# Patient Record
Sex: MALE | Race: WHITE
[De-identification: names, ages, dates, MRNs, and addresses within clinical notes are randomized per-mention and may not be internally consistent; named-entity substitution may affect disease eponyms.]

---

## 2018-01-03 ENCOUNTER — HOSPITAL ENCOUNTER (EMERGENCY)
Dept: HOSPITAL 63 - ER | Age: 53
LOS: 1 days | Discharge: ANOTHER HOSPITAL | End: 2018-01-04
Payer: COMMERCIAL

## 2018-01-03 VITALS — HEIGHT: 68 IN | WEIGHT: 220 LBS | BODY MASS INDEX: 33.34 KG/M2

## 2018-01-03 DIAGNOSIS — T18.128A: Primary | ICD-10-CM
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
## 2018-01-03 DIAGNOSIS — K21.9: ICD-10-CM

## 2018-01-03 DIAGNOSIS — R10.13: ICD-10-CM

## 2018-01-03 DIAGNOSIS — I10: ICD-10-CM
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
## 2018-01-03 LAB
ALBUMIN SERPL-MCNC: 4.1 G/DL (ref 3.4–5)
ALBUMIN/GLOB SERPL: 1.2 {RATIO} (ref 1–1.7)
ALP SERPL-CCNC: 86 U/L (ref 46–116)
ALT SERPL-CCNC: 51 U/L (ref 16–63)
ANION GAP SERPL CALC-SCNC: 9 MMOL/L (ref 6–14)
AST SERPL-CCNC: 19 U/L (ref 15–37)
BASOPHILS # BLD AUTO: 0.1 X10^3/UL (ref 0–0.2)
BASOPHILS NFR BLD: 1 % (ref 0–3)
BILIRUB SERPL-MCNC: 0.5 MG/DL (ref 0.2–1)
BUN/CREAT SERPL: 12 (ref 6–20)
CA-I SERPL ISE-MCNC: 16 MG/DL (ref 8–26)
CALCIUM SERPL-MCNC: 9.3 MG/DL (ref 8.5–10.1)
CHLORIDE SERPL-SCNC: 98 MMOL/L (ref 98–107)
CK SERPL-CCNC: 72 U/L (ref 39–308)
CO2 SERPL-SCNC: 30 MMOL/L (ref 21–32)
CREAT SERPL-MCNC: 1.3 MG/DL (ref 0.7–1.3)
EOSINOPHIL NFR BLD: 0 % (ref 0–3)
EOSINOPHIL NFR BLD: 0 X10^3/UL (ref 0–0.7)
ERYTHROCYTE [DISTWIDTH] IN BLOOD BY AUTOMATED COUNT: 13.2 % (ref 11.5–14.5)
GFR SERPLBLD BASED ON 1.73 SQ M-ARVRAT: 58.2 ML/MIN
GLOBULIN SER-MCNC: 3.4 G/DL (ref 2.2–3.8)
GLUCOSE SERPL-MCNC: 94 MG/DL (ref 70–99)
HCT VFR BLD CALC: 52.5 % (ref 39–53)
HGB BLD-MCNC: 18.2 G/DL (ref 13–17.5)
LIPASE: 542 U/L (ref 73–393)
LYMPHOCYTES # BLD: 1.9 X10^3/UL (ref 1–4.8)
LYMPHOCYTES NFR BLD AUTO: 18 % (ref 24–48)
MCH RBC QN AUTO: 32 PG (ref 25–35)
MCHC RBC AUTO-ENTMCNC: 35 G/DL (ref 31–37)
MCV RBC AUTO: 92 FL (ref 79–100)
MONO #: 0.5 X10^3/UL (ref 0–1.1)
MONOCYTES NFR BLD: 5 % (ref 0–9)
NEUT #: 8.2 X10^3UL (ref 1.8–7.7)
NEUTROPHILS NFR BLD AUTO: 76 % (ref 31–73)
PLATELET # BLD AUTO: 264 X10^3/UL (ref 140–400)
POTASSIUM SERPL-SCNC: 3.7 MMOL/L (ref 3.5–5.1)
PROT SERPL-MCNC: 7.5 G/DL (ref 6.4–8.2)
RBC # BLD AUTO: 5.73 X10^6/UL (ref 4.3–5.7)
SODIUM SERPL-SCNC: 137 MMOL/L (ref 136–145)
WBC # BLD AUTO: 10.7 X10^3/UL (ref 4–11)

## 2018-01-03 PROCEDURE — 99285 EMERGENCY DEPT VISIT HI MDM: CPT

## 2018-01-03 PROCEDURE — S0028 INJECTION, FAMOTIDINE, 20 MG: HCPCS

## 2018-01-03 PROCEDURE — 93005 ELECTROCARDIOGRAM TRACING: CPT

## 2018-01-03 PROCEDURE — 85025 COMPLETE CBC W/AUTO DIFF WBC: CPT

## 2018-01-03 PROCEDURE — 82550 ASSAY OF CK (CPK): CPT

## 2018-01-03 PROCEDURE — 96375 TX/PRO/DX INJ NEW DRUG ADDON: CPT

## 2018-01-03 PROCEDURE — 74177 CT ABD & PELVIS W/CONTRAST: CPT

## 2018-01-03 PROCEDURE — 96374 THER/PROPH/DIAG INJ IV PUSH: CPT

## 2018-01-03 PROCEDURE — 83690 ASSAY OF LIPASE: CPT

## 2018-01-03 PROCEDURE — 36415 COLL VENOUS BLD VENIPUNCTURE: CPT

## 2018-01-03 PROCEDURE — 84484 ASSAY OF TROPONIN QUANT: CPT

## 2018-01-03 PROCEDURE — 85379 FIBRIN DEGRADATION QUANT: CPT

## 2018-01-03 PROCEDURE — 96361 HYDRATE IV INFUSION ADD-ON: CPT

## 2018-01-03 PROCEDURE — 80053 COMPREHEN METABOLIC PANEL: CPT

## 2018-01-03 PROCEDURE — 82150 ASSAY OF AMYLASE: CPT

## 2018-01-03 PROCEDURE — 71045 X-RAY EXAM CHEST 1 VIEW: CPT

## 2018-01-03 PROCEDURE — 83880 ASSAY OF NATRIURETIC PEPTIDE: CPT

## 2018-01-03 NOTE — EKG
[facility] [address], [place] [address]

Test Date:    2018               Test Time:    20:33:19

Pat Name:     ZULEMA DE JESUS           Department:   

Patient ID:   [id number]           Room:          

Gender:       M                        Technician:   JUANCARLOS

:          1965               Requested By: AMBERLY HERNANDEZ

Order Number: [id number]            Reading MD:   Corey Salas MD

                                 Measurements

Intervals                              Axis          

Rate:         94                       P:            19

WV:           138                      QRS:          -2

QRSD:         92                       T:            9

QT:           334                                    

QTc:          423                                    

                           Interpretive Statements

SINUS RHYTHM



Electronically Signed On 2018 9:47:55 CST by Corey Salas MD

## 2018-01-03 NOTE — PHYS DOC
General


Chief Complaint:  CHEST PAIN


Stated Complaint:  SOB/CHEST PAIN


Time Seen by MD:  20:36


Source:  patient


Exam Limitations:  no limitations


Problems:  





History of Present Illness


Initial Comments


Patient is a 52-year-old male complaining of chest and epigastric pain.


Patient was initially reported by staff to me as chest pain, I was occupied by 

code blue so verbal chest pain orders initiated.


Upon my evaluation patient states that on  he had 2 bites of steak 

immediately after which he developed epigastric pain with foreign body 

sensation and an urge to vomit. He saw his primary care doctor who diagnosed 

viral gastroenteritis and constipation and gave magnesium citrate for 

symptomatic care as well as GI referral due to the patient's history of 

esophageal strictures. Symptoms were manageable and the patient tried to avoid 

solid foods.


Patient states that on  he had one bite of prime rib with recurrence 

of symptoms. He's been trying to manage at home and today about 1700 symptoms 

worsened again after a bite of crab salad. Symptoms are now described as 

constant and severe, he has a severe epigastric feeling of pain and foreign 

body sensation. He belches and retches almost constantly but has had no 

vomiting. He is tolerating his secretions, he becomes tachycardic and 

hypertensive when the symptoms worsen and had no relief with nitroglycerin 

sublingually on ED arrival. He says he feels "cleaned out" and has had loose 

stools due to the magnesium citrate. He denies diaphoresis arm or neck symptoms

, no headache or focal neurologic deficits no chills sweats or myalgias.


Timing/Duration:  other


Severity:  severe


Modifying Factors:  worse with eating


Associated Symptoms:  chest pain, nausea/vomiting, other


Allergies:  


Coded Allergies:  


     No Known Drug Allergies (Unverified , 1/3/18)





Past Medical History


Medical History:  GERD, hypertension, other (esophageal strictures)


Surgical History:  noncontributory





Social History


Smoker:  non-smoker


Alcohol:  occasionally


Drugs:  none





Review of Systems


Constitutional:  denies chills, denies diaphoresis, denies fever, denies malaise


EENTM:  denies ear pain, denies nose pain, denies throat pain, denies throat 

swelling, denies mouth pain, denies mouth swelling


Respiratory:  denies cough, shortness of breath, denies wheezing


Cardiovascular:  chest pain, denies palpitations, denies syncope


Gastrointestinal:  abdominal pain, nausea, denies vomiting


Musculoskeletal:  denies back pain, denies joint swelling, denies neck pain


Psychiatric/Neurological:  denies headache, denies numbness, denies paresthesia





Physical Exam


General Appearance:  WD/WN, severe distress


Eyes:  bilateral eye PERRL, bilateral eye EOMI, bilateral eye other (

conjunctivae injected bilaterally)


Ear, Nose, Throat:  hearing grossly normal, normal ENT inspection, normal 

pharynx


Neck:  non-tender, full range of motion, supple


Respiratory:  chest non-tender, normal breath sounds, no respiratory distress


Cardiovascular:  normal peripheral pulses, regular rate, rhythm


Gastrointestinal:  normal bowel sounds, non tender, soft, no organomegaly


Rectal:  deferred


Back:  no CVA tenderness, no vertebral tenderness


Extremities:  non-tender, normal inspection


Neurologic/Psychiatric:  CNs II-XII nml as tested, no motor/sensory deficits, 

alert, normal mood/affect, oriented x 3


Skin:  normal color, warm/dry





Orders, Labs, Meds


EKG: Normal sinus rhythm 94 bpm, nonspecific T contour abnormality no ST 

segment elevation. Interpreted by me.





AP chest: Increased perihilar markings with no focal consolidation or effusion 

interpreted by me.








Lipase 542 otherwise unremarkable lab workup





No relief with Solu-Medrol 125 mg, glucagon 1 mg, again. Zofran 4 mg, Toradol 

30 mg, and Pepcid 20 mg given over time. Due to elevated lipase CT evaluation 

initiated.














PATIENT: ZULEMA DE JESUS ACCOUNT: [id number] MRN#: [id number]


: 1965 LOCATION: ER AGE: 52


SEX: M EXAM DT: 18 ACCESSION#: [id number]


STATUS: REG ER ORD. PHYSICIAN: AMBERLY HERNANDEZ DO 


REASON: epigastric pain, n/v, elev lipase


PROCEDURE: CT ABD PELV W/ IV CONTRST ONLY





CT scan of the abdomen and pelvis with contrast 1/3/2018


 


CLINICAL HISTORY: Epigastric pain with elevated lipase.


 


TECHNIQUE: After the intravenous administration of 75 cc of Omnipaque 300 


only, contiguous, 5 mm axial sections were obtained through the abdomen 


and pelvis. 


 


One or more of the following individualized dose reduction techniques were


utilized for this study:


 


1. Automated exposure control.


2. Adjustment of the mA and/or kV according to patient size.


3. Use of iterative reconstruction technique.


 


FINDINGS: Comparison study is dated 2015.


 


Images through the lung bases demonstrate minimal dependent subsegmental 


atelectasis bilaterally. Small calcified granulomas are seen bilaterally. 


The liver parenchyma has a decreased attenuation consistent with mild 


fatty infiltration. The spleen, adrenal glands and kidneys are within 


normal limits. The pancreas is within normal limits. No focal abnormality 


of the pancreas is seen. No inflammatory changes are seen surrounding the 


pancreas. No abnormal fluid collection is seen to suggest evidence of a 


pancreatic pseudocyst.


 


The abdominal aorta tapers normally. The gallbladder is slightly 


contracted. No free fluid or free air is within the abdomen. There is no 


evidence of bowel obstruction. Surgical clips are seen posterior to the 


cecum consistent with an appendectomy.


 


Images through the pelvis demonstrate the urinary bladder distended with 


urine. No free fluid is seen. Small to moderate-sized fat-containing 


inguinal hernias are seen bilaterally. Minimal S-shaped curvature of the 


thoracolumbar spine is seen. Degenerative changes are seen involving the 


lower thoracic and throughout the lumbar spine.


 


IMPRESSION: No acute abnormality is seen.


 


 


Electronically signed by: Ej Hampton MD (1/3/2018 11:09 PM) [facility]














DICTATED AND SIGNED BY:     EJ HAMPTON MD


DATE:     18 9086





CC: TAMIKO MIJARES MD; AMBERLY HERNANDEZ DO ~








Prior to CT evaluation patient did receive 0.5 mg Dilaudid intravenously. He 

says that while the feeling was still there while the medication was working "I 

didn't care." Upon discussing CT results symptoms had returned. The patient 

does not feel like he can tolerate these symptoms at home and it is unlikely 

EGD could be scheduled as outpatient in a timely manner. Patient is agreeable 

to transfer to [facility] for inpatient admission and 

symptomatic care with probable GI consultation.





2355: I discussed the patient with on call hospitalist Dr. Calhoun. He agrees 

to accept the patient for MedSurg admission.





Impressions:


Esophageal foreign body sensation and possible partial obstruction


Intractable pain and nausea


History of esophageal strictures





A second dose dilaudid given, ativan 1mg IV with analgesia 2L O2 NC applied as 

sats temporarily dropped after meds to 89%.


Departure


Disposition:  02 XFER SHT-TRM HOSP


Condition:  STABLE





Additional Instructions:  


EMS transfer to [facility] for MedSurg admission Dr. Calhoun 

is accepting.











AMBERLY HERNANDEZ DO Rogelio 3, 2018 23:55

## 2018-01-03 NOTE — RAD
CT scan of the abdomen and pelvis with contrast 1/3/2018

 

CLINICAL HISTORY: Epigastric pain with elevated lipase.

 

TECHNIQUE: After the intravenous administration of 75 cc of Omnipaque 300 

only, contiguous, 5 mm axial sections were obtained through the abdomen 

and pelvis. 

 

One or more of the following individualized dose reduction techniques were

utilized for this study:

 

1. Automated exposure control.

2. Adjustment of the mA and/or kV according to patient size.

3. Use of iterative reconstruction technique.

 

FINDINGS: Comparison study is dated 4/5/2015.

 

Images through the lung bases demonstrate minimal dependent subsegmental 

atelectasis bilaterally. Small calcified granulomas are seen bilaterally. 

The liver parenchyma has a decreased attenuation consistent with mild 

fatty infiltration. The spleen, adrenal glands and kidneys are within 

normal limits. The pancreas is within normal limits. No focal abnormality 

of the pancreas is seen. No inflammatory changes are seen surrounding the 

pancreas. No abnormal fluid collection is seen to suggest evidence of a 

pancreatic pseudocyst.

 

The abdominal aorta tapers normally. The gallbladder is slightly 

contracted. No free fluid or free air is within the abdomen. There is no 

evidence of bowel obstruction. Surgical clips are seen posterior to the 

cecum consistent with an appendectomy.

 

Images through the pelvis demonstrate the urinary bladder distended with 

urine. No free fluid is seen. Small to moderate-sized fat-containing 

inguinal hernias are seen bilaterally. Minimal S-shaped curvature of the 

thoracolumbar spine is seen. Degenerative changes are seen involving the 

lower thoracic and throughout the lumbar spine.

 

IMPRESSION: No acute abnormality is seen.

 

 

Electronically signed by: Ej Hampton MD (1/3/2018 11:09 PM) [facility]

## 2018-01-04 ENCOUNTER — HOSPITAL ENCOUNTER (INPATIENT)
Dept: HOSPITAL 61 - 5 SOUTH | Age: 53
Discharge: HOME | DRG: 392 | End: 2018-01-04
Attending: FAMILY MEDICINE | Admitting: FAMILY MEDICINE
Payer: COMMERCIAL

## 2018-01-04 VITALS
DIASTOLIC BLOOD PRESSURE: 100 MMHG | SYSTOLIC BLOOD PRESSURE: 145 MMHG | SYSTOLIC BLOOD PRESSURE: 145 MMHG | DIASTOLIC BLOOD PRESSURE: 100 MMHG

## 2018-01-04 DIAGNOSIS — I10: ICD-10-CM

## 2018-01-04 DIAGNOSIS — Z71.6: ICD-10-CM

## 2018-01-04 DIAGNOSIS — K29.80: ICD-10-CM

## 2018-01-04 DIAGNOSIS — K21.0: Primary | ICD-10-CM

## 2018-01-04 DIAGNOSIS — R13.10: ICD-10-CM

## 2018-01-04 DIAGNOSIS — F17.210: ICD-10-CM

## 2018-01-04 DIAGNOSIS — R10.13: ICD-10-CM

## 2018-01-04 DIAGNOSIS — Z90.49: ICD-10-CM

## 2018-01-04 DIAGNOSIS — Z82.49: ICD-10-CM

## 2018-01-04 DIAGNOSIS — Z80.8: ICD-10-CM

## 2018-01-04 DIAGNOSIS — Z80.1: ICD-10-CM

## 2018-01-04 LAB
ADD MAN DIFF?: NO
ANION GAP SERPL CALC-SCNC: 8 MMOL/L (ref 6–14)
BASO #: 0 X10^3/UL (ref 0–0.2)
BASO %: 0 % (ref 0–3)
BLOOD UREA NITROGEN: 18 MG/DL (ref 8–26)
CALCIUM: 8.1 MG/DL (ref 8.5–10.1)
CHLORIDE: 102 MMOL/L (ref 98–107)
CO2 SERPL-SCNC: 28 MMOL/L (ref 21–32)
CREAT SERPL-MCNC: 1.2 MG/DL (ref 0.7–1.3)
EOS #: 0 X10^3/UL (ref 0–0.7)
EOS %: 0 % (ref 0–3)
GFR SERPLBLD BASED ON 1.73 SQ M-ARVRAT: 63.8 ML/MIN
GLUCOSE SERPL-MCNC: 129 MG/DL (ref 70–99)
HCG SERPL-ACNC: 6.6 X10^3/UL (ref 4–11)
HEMATOCRIT: 47.2 % (ref 39–53)
HEMOGLOBIN: 16 G/DL (ref 13–17.5)
LYMPH #: 1 X10^3/UL (ref 1–4.8)
LYMPH %: 16 % (ref 24–48)
MEAN CORPUSCULAR HEMOGLOBIN: 32 PG (ref 25–35)
MEAN CORPUSCULAR HGB CONC: 34 G/DL (ref 31–37)
MEAN CORPUSCULAR VOLUME: 94 FL (ref 79–100)
MONO #: 0.1 X10^3/UL (ref 0–1.1)
MONO %: 1 % (ref 0–9)
NEUT #: 5.4 X10^3UL (ref 1.8–7.7)
NEUT %: 83 % (ref 31–73)
PLATELET COUNT: 223 X10^3/UL (ref 140–400)
POTASSIUM SERPL-SCNC: 4.3 MMOL/L (ref 3.5–5.1)
RED BLOOD COUNT: 5.05 X10^6/UL (ref 4.3–5.7)
RED CELL DISTRIBUTION WIDTH: 12.9 % (ref 11.5–14.5)
SODIUM: 138 MMOL/L (ref 136–145)

## 2018-01-04 PROCEDURE — 85025 COMPLETE CBC W/AUTO DIFF WBC: CPT

## 2018-01-04 PROCEDURE — 36415 COLL VENOUS BLD VENIPUNCTURE: CPT

## 2018-01-04 PROCEDURE — 80048 BASIC METABOLIC PNL TOTAL CA: CPT

## 2018-01-04 PROCEDURE — 0DJ08ZZ INSPECTION OF UPPER INTESTINAL TRACT, VIA NATURAL OR ARTIFICIAL OPENING ENDOSCOPIC: ICD-10-PCS

## 2018-01-04 RX ADMIN — FAMOTIDINE 1 MG: 10 INJECTION, SOLUTION INTRAVENOUS at 10:00

## 2018-01-04 RX ADMIN — HYDROMORPHONE HYDROCHLORIDE 1 MG: 2 INJECTION INTRAMUSCULAR; INTRAVENOUS; SUBCUTANEOUS at 03:52

## 2018-01-04 RX ADMIN — FAMOTIDINE 1 MG: 10 INJECTION, SOLUTION INTRAVENOUS at 09:30

## 2018-01-04 RX ADMIN — PANTOPRAZOLE SODIUM 1 MG: 40 TABLET, DELAYED RELEASE ORAL at 13:19

## 2018-01-04 NOTE — RAD
Portable chest, 1/3/2018:



History: Chest pain



Comparison is made to a study from 4/5/2015. The heart size and pulmonary

vascularity are normal. No pulmonary infiltrates are seen. There is no

evidence of pleural fluid.



IMPRESSION: No acute cardiopulmonary abnormality is detected.

## 2018-01-15 ENCOUNTER — HOSPITAL ENCOUNTER (OUTPATIENT)
Dept: HOSPITAL 63 - CT | Age: 53
Discharge: HOME | End: 2018-01-15
Payer: COMMERCIAL

## 2018-01-15 DIAGNOSIS — J32.0: Primary | ICD-10-CM

## 2018-01-15 DIAGNOSIS — I10: ICD-10-CM

## 2018-01-15 PROCEDURE — 70486 CT MAXILLOFACIAL W/O DYE: CPT

## 2018-01-15 NOTE — RAD
CT maxillofacial without contrast 1/15/2018



Clinical indication: Sinusitis.



Comparison: None.



Technique: Multiple CT images of the maxillofacial region were obtained

without contrast.



PQRS Compliance Statement:



One or more of the following individualized dose reduction techniques were

utilized for this examination:

1. Automated exposure control

2. Adjustment of the mA and/or kV according to patient size

3. Use of iterative reconstruction technique



Findings:

There is mild right maxillary sinus mucosal thickening with dependent aerated

secretions.  There is opacification of the right maxillary ostium, though the

infundibulum and hiatus semilunaris remain patent.  Left maxillary, ethmoid,

frontal and sphenoid sinuses are well aerated.  The mastoid air cells are well

aerated.  The left ostiomeatal unit is patent.  There is a right middle gilberto

bullosa noted.  The globes and orbits are unremarkable.  The intraconal fat is

preserved.  The intraocular muscles are symmetric.



Impression:

1.  Mild right maxillary sinus mucosal thickening with aerated secretions may

represent acute sinusitis.

2.  Opacification of the right maxillary ostium.

## 2019-08-22 ENCOUNTER — HOSPITAL ENCOUNTER (EMERGENCY)
Dept: HOSPITAL 63 - ER | Age: 54
Discharge: HOME | End: 2019-08-22
Payer: COMMERCIAL

## 2019-08-22 VITALS — HEIGHT: 68 IN | WEIGHT: 208.34 LBS | BODY MASS INDEX: 31.57 KG/M2

## 2019-08-22 VITALS — SYSTOLIC BLOOD PRESSURE: 112 MMHG | DIASTOLIC BLOOD PRESSURE: 73 MMHG

## 2019-08-22 DIAGNOSIS — R09.81: ICD-10-CM

## 2019-08-22 DIAGNOSIS — F17.220: ICD-10-CM

## 2019-08-22 DIAGNOSIS — I10: ICD-10-CM

## 2019-08-22 DIAGNOSIS — K21.9: ICD-10-CM

## 2019-08-22 DIAGNOSIS — R07.2: ICD-10-CM

## 2019-08-22 DIAGNOSIS — R06.00: Primary | ICD-10-CM

## 2019-08-22 LAB
ALBUMIN SERPL-MCNC: 3.9 G/DL (ref 3.4–5)
ALBUMIN/GLOB SERPL: 1.3 {RATIO} (ref 1–1.7)
ALP SERPL-CCNC: 70 U/L (ref 46–116)
ALT SERPL-CCNC: 35 U/L (ref 16–63)
ANION GAP SERPL CALC-SCNC: 9 MMOL/L (ref 6–14)
APTT PPP: (no result) S
AST SERPL-CCNC: 17 U/L (ref 15–37)
BACTERIA #/AREA URNS HPF: 0 /HPF
BASOPHILS # BLD AUTO: 0.1 X10^3/UL (ref 0–0.2)
BASOPHILS NFR BLD: 1 % (ref 0–3)
BGAS PCO2: 35 MMHG (ref 35–46)
BGAS PH: 7.4 (ref 7.35–7.46)
BGAS PO2: 80 MMHG (ref 80–100)
BILIRUB SERPL-MCNC: 0.4 MG/DL (ref 0.2–1)
BILIRUB UR QL STRIP: (no result)
BUN/CREAT SERPL: 12 (ref 6–20)
CA-I SERPL ISE-MCNC: 13 MG/DL (ref 8–26)
CALCIUM SERPL-MCNC: 9.1 MG/DL (ref 8.5–10.1)
CHLORIDE SERPL-SCNC: 103 MMOL/L (ref 98–107)
CO2 SERPL-SCNC: 28 MMOL/L (ref 21–32)
CREAT SERPL-MCNC: 1.1 MG/DL (ref 0.7–1.3)
DELTA BASE BGAS: -3 MMOL/L (ref 0–3)
EOSINOPHIL NFR BLD: 0.3 X10^3/UL (ref 0–0.7)
EOSINOPHIL NFR BLD: 3 % (ref 0–3)
ERYTHROCYTE [DISTWIDTH] IN BLOOD BY AUTOMATED COUNT: 12.8 % (ref 11.5–14.5)
FIBRINOGEN PPP-MCNC: CLEAR MG/DL
GFR SERPLBLD BASED ON 1.73 SQ M-ARVRAT: 70 ML/MIN
GLOBULIN SER-MCNC: 3.1 G/DL (ref 2.2–3.8)
GLUCOSE SERPL-MCNC: 92 MG/DL (ref 70–99)
GLUCOSE UR STRIP-MCNC: (no result) MG/DL
HCT VFR BLD CALC: 46.5 % (ref 39–53)
HGB BLD-MCNC: 16.2 G/DL (ref 13–17.5)
LYMPHOCYTES # BLD: 3 X10^3/UL (ref 1–4.8)
LYMPHOCYTES NFR BLD AUTO: 40 % (ref 24–48)
MAGNESIUM SERPL-MCNC: 2.1 MG/DL (ref 1.8–2.4)
MCH RBC QN AUTO: 31 PG (ref 25–35)
MCHC RBC AUTO-ENTMCNC: 35 G/DL (ref 31–37)
MCV RBC AUTO: 90 FL (ref 79–100)
MONO #: 0.6 X10^3/UL (ref 0–1.1)
MONOCYTES NFR BLD: 8 % (ref 0–9)
NEUT #: 3.6 X10^3UL (ref 1.8–7.7)
NEUTROPHILS NFR BLD AUTO: 47 % (ref 31–73)
NITRITE UR QL STRIP: (no result)
O2 SAT BGAS: 96 % (ref 92–99)
O2/TOTAL GAS SETTING VFR VENT: 21 %
PLATELET # BLD AUTO: 244 X10^3/UL (ref 140–400)
POTASSIUM SERPL-SCNC: 3.5 MMOL/L (ref 3.5–5.1)
PROT SERPL-MCNC: 7 G/DL (ref 6.4–8.2)
RBC # BLD AUTO: 5.14 X10^6/UL (ref 4.3–5.7)
RBC #/AREA URNS HPF: 0 /HPF (ref 0–2)
SODIUM SERPL-SCNC: 140 MMOL/L (ref 136–145)
SP GR UR STRIP: <=1.005
SQUAMOUS #/AREA URNS LPF: (no result) /LPF
UROBILINOGEN UR-MCNC: 0.2 MG/DL
WBC # BLD AUTO: 7.6 X10^3/UL (ref 4–11)
WBC #/AREA URNS HPF: (no result) /HPF (ref 0–4)

## 2019-08-22 PROCEDURE — 85025 COMPLETE CBC W/AUTO DIFF WBC: CPT

## 2019-08-22 PROCEDURE — 80053 COMPREHEN METABOLIC PANEL: CPT

## 2019-08-22 PROCEDURE — 36600 WITHDRAWAL OF ARTERIAL BLOOD: CPT

## 2019-08-22 PROCEDURE — 84484 ASSAY OF TROPONIN QUANT: CPT

## 2019-08-22 PROCEDURE — 83605 ASSAY OF LACTIC ACID: CPT

## 2019-08-22 PROCEDURE — 83735 ASSAY OF MAGNESIUM: CPT

## 2019-08-22 PROCEDURE — 83880 ASSAY OF NATRIURETIC PEPTIDE: CPT

## 2019-08-22 PROCEDURE — 99285 EMERGENCY DEPT VISIT HI MDM: CPT

## 2019-08-22 PROCEDURE — 85379 FIBRIN DEGRADATION QUANT: CPT

## 2019-08-22 PROCEDURE — 82803 BLOOD GASES ANY COMBINATION: CPT

## 2019-08-22 PROCEDURE — 81001 URINALYSIS AUTO W/SCOPE: CPT

## 2019-08-22 PROCEDURE — 93005 ELECTROCARDIOGRAM TRACING: CPT

## 2019-08-22 PROCEDURE — 87040 BLOOD CULTURE FOR BACTERIA: CPT

## 2019-08-22 PROCEDURE — 71046 X-RAY EXAM CHEST 2 VIEWS: CPT

## 2019-08-22 PROCEDURE — 36415 COLL VENOUS BLD VENIPUNCTURE: CPT

## 2019-08-22 NOTE — PHYS DOC
Past History


Past Medical History:  GERD, Hypertension


Past Surgical History:  Appendectomy


Smoking:  Non-smoker, Chew


Additional Smoking Information:  


chews tobacco


Alcohol Use:  Heavy


Additional Alcohol Information:  


everyother day half a glass of vodka


Drug Use:  None





Adult General


Chief Complaint


Chief Complaint:  DYSPNEA/RESPIRATOY DISTRESS





HPI


HPI


Patient is a 54-year-old male, with a history of hypertension and GERD, who 

presents to the emergency department for evaluation. He states for the past 9 

days, he has had a sensation that something is closing up inside his chest in 

his throat. He does report some generalized pain in his midsternal area, which 

has been present for the past 9 days. He saw his PCP 2 days ago and was 

diagnosed with a sinus infection, and when he did not improve, was prescribed 

Augmentin yesterday. He has had some nasal congestion, but no significant cough.

He denies any pleuritic or exertional pain. Prior to arrival he took her family 

members nitroglycerin as well as some Benadryl, and somewhat sleepy after taking

the Benadryl, but his wife states that this is more sleepy than he normally 

would be in response to Benadryl. He has not had any fevers or chills, numbness,

or weakness. There are no alleviating or exacerbating factors to his symptoms 

otherwise.





Review of Systems


Review of Systems





Constitutional: Denies fever or chills []


Eyes: Denies change in visual acuity, redness, or eye pain []


HENT: Denies otalgia or sore throat. Reports mild nasal congestion. []


Respiratory: Denies cough or voice changes.[]


Cardiovascular: No additional information not addressed in HPI []


GI: Denies abdominal pain, nausea, vomiting, bloody stools or diarrhea []


: Denies dysuria or hematuria []


Musculoskeletal: Denies back pain or joint pain []


Integument: Denies rash or skin lesions []


Neurologic: Denies headache, focal weakness or sensory changes []


Endocrine: Denies polyuria or polydipsia []





All other systems were reviewed and found to be within normal limits, except as 

documented in this note.





Current Medications


Current Medications





Current Medications








 Medications


  (Trade)  Dose


 Ordered  Sig/Fernando  Start Time


 Stop Time Status Last Admin


Dose Admin


 


 Aspirin


  ([facility]


 Aspirin)  324 mg  1X  ONCE  8/22/19 21:15


 8/22/19 21:16 UNV  














Allergies


Allergies





Allergies








Coded Allergies Type Severity Reaction Last Updated Verified


 


  No Known Drug Allergies    1/3/18 No











Physical Exam


Physical Exam


PHYSICAL EXAM:





CONSTITUTIONAL: Well developed, well nourished


HEAD: normocephalic, atraumatic


EENT: PERRL, EOMI. Conjunctivae normal color, sclerae non-icteric; moist mucous 

membranes. The oropharynx is nonerythematous.


NECK: Supple, non-tender; no meningismus. There is no stridor. Voice is normal.


LUNGS: Lungs CTA, breathing even and unlabored. Normal air movement.


HEART: Regular rate and rhythm, no murmur


CHEST: No deformity; non-tender


ABDOMEN: The abdomen is soft, and non-tender, no masses or bruits.


EXTREM: Normal ROM; no deformity, no calf tenderness. Normal pulses palpable in 

all extremities. There is no pedal edema.


SKIN: No rash; no diaphoresis


NEURO: somnolent but arousable,  normal speech and cognition; CN's grossly 

intact; strength grossly intact without focal deficit.


BACK: No CVA TTP.





Current Patient Data


Vital Signs





                                   Vital Signs








  Date Time  Temp Pulse Resp B/P (MAP) Pulse Ox O2 Delivery O2 Flow Rate FiO2


 


8/22/19 20:52 98.4 71 18  96 Room Air  








Lab Results





Laboratory Tests








Test


 8/22/19


21:06 8/22/19


21:10


 


Blood Gas pH 7.40  


 


Blood Gas PCO2 35 mmHg  


 


Blood Gas PO2 80 mmHg  


 


Blood Gas HCO3 22 mmol/L  


 


Arterial Bld O2 Saturation


(Calc) 96 % 


 





 


FiO2 21 %  


 


White Blood Count  7.6 x10^3/uL 


 


Red Blood Count  5.14 x10^6/uL 


 


Hemoglobin  16.2 g/dL 


 


Hematocrit  46.5 % 


 


Mean Corpuscular Volume  90 fL 


 


Mean Corpuscular Hemoglobin  31 pg 


 


Mean Corpuscular Hemoglobin


Concent 


 35 g/dL 





 


Red Cell Distribution Width  12.8 % 


 


Platelet Count  244 x10^3/uL 


 


Neutrophils (%) (Auto)  47 % 


 


Lymphocytes (%) (Auto)  40 % 


 


Monocytes (%) (Auto)  8 % 


 


Eosinophils (%) (Auto)  3 % 


 


Basophils (%) (Auto)  1 % 


 


Neutrophils # (Auto)  3.6 x10^3uL 


 


Lymphocytes # (Auto)  3.0 x10^3/uL 


 


Monocytes # (Auto)  0.6 x10^3/uL 


 


Eosinophils # (Auto)  0.3 x10^3/uL 


 


Basophils # (Auto)  0.1 x10^3/uL 


 


D-Dimer (Liliam)  < 0.19 mg/L 


 


Sodium Level  140 mmol/L 


 


Potassium Level  3.5 mmol/L 


 


Chloride Level  103 mmol/L 


 


Carbon Dioxide Level  28 mmol/L 


 


Anion Gap  9 


 


Blood Urea Nitrogen  13 mg/dL 


 


Creatinine  1.1 mg/dL 


 


Estimated GFR


(Cockcroft-Gault) 


 70.0 





 


BUN/Creatinine Ratio  12 


 


Glucose Level  92 mg/dL 


 


Lactic Acid Level  1.9 mmol/L 


 


Calcium Level  9.1 mg/dL 


 


Magnesium Level  2.1 mg/dL 


 


Total Bilirubin  0.4 mg/dL 


 


Aspartate Amino Transf


(AST/SGOT) 


 17 U/L 





 


Alanine Aminotransferase


(ALT/SGPT) 


 35 U/L 





 


Alkaline Phosphatase  70 U/L 


 


Troponin I Quantitative  < 0.017 ng/mL 


 


NT-Pro-B-Type Natriuretic


Peptide 


 40 pg/mL 





 


Total Protein  7.0 g/dL 


 


Albumin  3.9 g/dL 


 


Albumin/Globulin Ratio  1.3 








Current Medications








 Medications


  (Trade)  Dose


 Ordered  Sig/Fernando


 Route


 PRN Reason  Start Time


 Stop Time Status Last Admin


Dose Admin


 


 Aspirin


  ([facility]


 Aspirin)  324 mg  1X  ONCE


 PO


   8/22/19 21:15


 8/22/19 21:16 DC 8/22/19 21:40














EKG


EKG


Normal sinus rhythm at a rate of 60 beats for minute, left axis deviation, 

normal intervals. There are no acute ischemic ST/T changes.[]





Radiology/Procedures


Radiology/Procedures


[]ER physician preliminary chest x-ray interpretation: No acute disease.





Course & Med Decision Making


Course & Med Decision Making


Pertinent Labs and Imaging studies reviewed. (See chart for details)





[]10:45 PM: The patient's condition remains stable. I discussed the uncertain 

etiology of his symptoms. I discussed overnight hospitalization for further 

observation and monitoring but the patient declines, stating he would rather go 

home. I discussed the uncertain etiology of his symptoms although he does have 

an appointment with GI this coming Wednesday for further evaluation of his GERD.

 I discussed importance of close follow-up and return precautions in detail. 











11:30 PM:


Soon after the patient's departure, the radiologist report he came available for

 the patient's x-ray, raising the possibility of an occult pneumonia. The 

patient was put on amoxicillin/clavulanic acid by his primary care provider 

yesterday and he was encouraged to continue that prior to leaving. Clinically 

does not have pneumonia, but rather an upper respiratory infection.





Dragon Disclaimer


Dragon Disclaimer


This electronic medical record was generated, in whole or in part, using a voice

 recognition dictation system.





Departure


Departure:


Impression:  


   Primary Impression:  


   Dyspnea


Disposition:  01 HOME, SELF-CARE


Condition:  STABLE


Referrals:  


ALEKSANDRA KING MD (PCP)


Patient Instructions:  Acute Bronchitis, Shortness of Breath, Upper Respiratory 

Infection, Adult











GIOVANNA SELLERS MD           Aug 22, 2019 21:13

## 2019-08-22 NOTE — RAD
Exam: Chest 2 views

 

INDICATION: Shortness of breath

 

TECHNIQUE: Frontal and lateral views of the chest

 

Comparisons: None

 

FINDINGS:

The cardiomediastinal silhouette and pulmonary vessels are within normal 

limits.

 

Retrocardiac opacity seen best on lateral view. Remaining lungs are clear.

 

IMPRESSION:

Cardiac opacity, favored to be infectious in etiology. Recommend reimaging

post treatment to ensure resolution.

 

Electronically signed by: Fortino Zamorano MD (8/22/2019 11:15 PM) [facility]

## 2019-08-23 NOTE — EKG
[facility] [address], [place] [address]

Test Date:    2019               Test Time:    21:01:39

Pat Name:     ZULEMA DE JESUS           Department:   

Patient ID:   [id number]           Room:          

Gender:       M                        Technician:   

:          1965               Requested By: GIOVANNA SELLERS

Order Number: [id number]            Reading MD:   Corey Salas MD

                                 Measurements

Intervals                              Axis          

Rate:         68                       P:            24

CA:           140                      QRS:          -11

QRSD:         90                       T:            7

QT:           390                                    

QTc:          419                                    

                           Interpretive Statements

SINUS RHYTHM



Electronically Signed On 2019 16:05:58 CDT by Corey Salas MD

## 2020-02-07 ENCOUNTER — HOSPITAL ENCOUNTER (OUTPATIENT)
Dept: HOSPITAL 61 - ENDOS | Age: 55
Discharge: HOME | End: 2020-02-07
Attending: INTERNAL MEDICINE
Payer: COMMERCIAL

## 2020-02-07 VITALS — SYSTOLIC BLOOD PRESSURE: 114 MMHG | DIASTOLIC BLOOD PRESSURE: 81 MMHG

## 2020-02-07 DIAGNOSIS — K64.0: ICD-10-CM

## 2020-02-07 DIAGNOSIS — K21.9: ICD-10-CM

## 2020-02-07 DIAGNOSIS — I10: ICD-10-CM

## 2020-02-07 DIAGNOSIS — Z86.010: ICD-10-CM

## 2020-02-07 DIAGNOSIS — Z12.11: Primary | ICD-10-CM

## 2020-02-07 DIAGNOSIS — Z72.0: ICD-10-CM

## 2020-02-07 DIAGNOSIS — Z72.89: ICD-10-CM

## 2020-02-07 DIAGNOSIS — K57.30: ICD-10-CM

## 2020-02-07 PROCEDURE — 45378 DIAGNOSTIC COLONOSCOPY: CPT
